# Patient Record
Sex: FEMALE | Race: WHITE | URBAN - METROPOLITAN AREA
[De-identification: names, ages, dates, MRNs, and addresses within clinical notes are randomized per-mention and may not be internally consistent; named-entity substitution may affect disease eponyms.]

---

## 2017-05-22 ENCOUNTER — HISTORICAL (OUTPATIENT)
Dept: RADIOLOGY | Facility: HOSPITAL | Age: 42
End: 2017-05-22

## 2017-06-14 ENCOUNTER — HISTORICAL (OUTPATIENT)
Dept: RADIOLOGY | Facility: HOSPITAL | Age: 42
End: 2017-06-14

## 2017-06-22 ENCOUNTER — HISTORICAL (OUTPATIENT)
Dept: RADIOLOGY | Facility: HOSPITAL | Age: 42
End: 2017-06-22

## 2017-06-22 LAB — POC CREATININE: 0.8 MG/DL (ref 0.6–1.3)

## 2018-04-28 LAB
BILIRUB SERPL-MCNC: NEGATIVE MG/DL
BLOOD URINE, POC: NEGATIVE
CLARITY, POC UA: CLEAR
COLOR, POC UA: NORMAL
GLUCOSE UR QL STRIP: NEGATIVE
KETONES UR QL STRIP: NEGATIVE
LEUKOCYTE EST, POC UA: NEGATIVE
NITRITE, POC UA: NEGATIVE
PH, POC UA: 5
PROTEIN, POC: NEGATIVE
SPECIFIC GRAVITY, POC UA: 1.02
UROBILINOGEN, POC UA: NORMAL

## 2022-04-10 ENCOUNTER — HISTORICAL (OUTPATIENT)
Dept: ADMINISTRATIVE | Facility: HOSPITAL | Age: 47
End: 2022-04-10

## 2022-04-29 VITALS
OXYGEN SATURATION: 98 % | BODY MASS INDEX: 22.9 KG/M2 | DIASTOLIC BLOOD PRESSURE: 84 MMHG | HEIGHT: 60 IN | WEIGHT: 116.63 LBS | SYSTOLIC BLOOD PRESSURE: 129 MMHG

## 2022-09-16 ENCOUNTER — HISTORICAL (OUTPATIENT)
Dept: ADMINISTRATIVE | Facility: HOSPITAL | Age: 47
End: 2022-09-16

## 2024-10-21 LAB
PAP RECOMMENDATION EXT: NORMAL
PAP SMEAR: NORMAL

## 2024-10-23 LAB — BCS RECOMMENDATION EXT: NORMAL

## 2025-04-02 ENCOUNTER — OFFICE VISIT (OUTPATIENT)
Dept: PRIMARY CARE CLINIC | Facility: CLINIC | Age: 50
End: 2025-04-02
Payer: COMMERCIAL

## 2025-04-02 ENCOUNTER — DOCUMENTATION ONLY (OUTPATIENT)
Dept: PRIMARY CARE CLINIC | Facility: CLINIC | Age: 50
End: 2025-04-02

## 2025-04-02 VITALS
RESPIRATION RATE: 18 BRPM | OXYGEN SATURATION: 98 % | BODY MASS INDEX: 25.16 KG/M2 | TEMPERATURE: 99 F | WEIGHT: 124.81 LBS | SYSTOLIC BLOOD PRESSURE: 137 MMHG | DIASTOLIC BLOOD PRESSURE: 87 MMHG | HEIGHT: 59 IN | HEART RATE: 92 BPM

## 2025-04-02 DIAGNOSIS — Z00.00 VISIT FOR ANNUAL HEALTH EXAMINATION: ICD-10-CM

## 2025-04-02 DIAGNOSIS — F32.A DEPRESSION, UNSPECIFIED DEPRESSION TYPE: ICD-10-CM

## 2025-04-02 DIAGNOSIS — I10 HYPERTENSION, UNSPECIFIED TYPE: ICD-10-CM

## 2025-04-02 DIAGNOSIS — Z12.11 COLON CANCER SCREENING: Primary | ICD-10-CM

## 2025-04-02 DIAGNOSIS — Z76.89 ENCOUNTER TO ESTABLISH CARE WITH NEW DOCTOR: Primary | ICD-10-CM

## 2025-04-02 RX ORDER — LISINOPRIL 5 MG/1
5 TABLET ORAL DAILY
COMMUNITY
Start: 2024-12-14

## 2025-04-02 RX ORDER — BUPROPION HYDROCHLORIDE 300 MG/1
300 TABLET ORAL DAILY
COMMUNITY

## 2025-04-02 RX ORDER — VENLAFAXINE HYDROCHLORIDE 75 MG/1
75 CAPSULE, EXTENDED RELEASE ORAL DAILY
COMMUNITY
Start: 2024-03-30

## 2025-04-02 NOTE — ASSESSMENT & PLAN NOTE
Diastolic elevated today   Hold off on refilling lisinopril today  Rechecked at annual in 2 weeks   CBC, CMP

## 2025-04-02 NOTE — PROGRESS NOTES
Family Medicine    Patient ID: [id number]     Chief Complaint: Establish Care (Switching care from ro[facility] /No problems or concerns )      HPI:     Marianne Tineo is a 50 y.o. female here today establish care.  She has a history of depression and sees psychology for those medications.  He previously took lisinopril but has not taken it in several months.  Diastolic BP mildly elevated today.  We will recheck on this in a couple of weeks when she returns for her annual    All healthcare gaps we will be discussed at her annual on a couple of weeks.  Briefly patient is Pap and mammogram were done October of 2024 and we will obtain those records, and we will order a colonoscopy today.    MEDICAL HISTORY:       Past Medical History:   Diagnosis Date    Anxiety     Depression     Hypertension         Past Surgical History:   Procedure Laterality Date    HYSTERECTOMY          Social History     Tobacco Use    Smoking status: Never    Smokeless tobacco: Current   Substance and Sexual Activity    Alcohol use: Yes    Drug use: Never    Sexual activity: Not on file        Current Outpatient Medications   Medication Instructions    buPROPion (WELLBUTRIN XL) 300 mg, Daily    EFFEXOR XR 75 mg, Daily    lisinopriL (PRINIVIL,ZESTRIL) 5 mg, Daily       Review of patient's allergies indicates:  No Known Allergies     Patient Care Team:  Margarito August MD as PCP - General (Family Medicine)  Jonn Stone Jr., MD as Consulting Physician (Obstetrics and Gynecology)     Subjective:     Review of Systems   Constitutional:  Negative for activity change, appetite change, fever and unexpected weight change.   HENT:  Negative for congestion, rhinorrhea and sore throat.    Eyes:  Negative for visual disturbance.   Respiratory:  Negative for cough, chest tightness and shortness of breath.    Cardiovascular:  Negative for chest pain, palpitations and leg swelling.   Gastrointestinal:  Negative for abdominal pain, blood in stool,  "nausea and vomiting.   Genitourinary:  Negative for difficulty urinating.   Musculoskeletal:  Negative for arthralgias.   Skin:  Negative for rash.   Neurological:  Negative for dizziness, speech difficulty, weakness, light-headedness and numbness.   Psychiatric/Behavioral:  Negative for behavioral problems, confusion, dysphoric mood, self-injury, sleep disturbance and suicidal ideas.        12 point review of systems conducted, negative except as stated in the history of present illness. See HPI for details.    Objective:     Visit Vitals  /87 (BP Location: Right arm, Patient Position: Sitting)   Pulse 92   Temp 98.5 °F (36.9 °C) (Oral)   Resp 18   Ht 4' 11" (1.499 m)   Wt 56.6 kg (124 lb 12.8 oz)   SpO2 98%   BMI 25.21 kg/m²       Physical Exam  Constitutional:       General: She is not in acute distress.     Appearance: Normal appearance. She is not ill-appearing.   HENT:      Head: Normocephalic and atraumatic.      Right Ear: External ear normal.      Left Ear: External ear normal.      Nose: No congestion.   Eyes:      General:         Right eye: No discharge.         Left eye: No discharge.      Extraocular Movements: Extraocular movements intact.      Conjunctiva/sclera: Conjunctivae normal.   Cardiovascular:      Rate and Rhythm: Normal rate and regular rhythm.   Pulmonary:      Effort: Pulmonary effort is normal. No respiratory distress.      Breath sounds: Normal breath sounds.   Musculoskeletal:      Right lower leg: No edema.      Left lower leg: No edema.   Skin:     Capillary Refill: Capillary refill takes less than 2 seconds.   Neurological:      Mental Status: She is alert and oriented to person, place, and time. Mental status is at baseline.   Psychiatric:         Mood and Affect: Mood normal.         Behavior: Behavior normal.         Thought Content: Thought content normal.         Judgment: Judgment normal.         Recent labs:     Chemistry:  No results found for: "NA", "K", "CHLORIDE", " ""BUN", "CREATININE", "EGFRNORACEVR", "GLUCOSE", "CALCIUM", "ALKPHOS", "LABPROT", "ALBUMIN", "BILIDIR", "IBILI", "AST", "ALT", "MG", "PHOS", "CLBSSESU71DO", "TSH", "[id number]", "PSA"     No results found for: "HGBA1C", "MICROALBCREA"     Hematology:  No results found for: "WBC", "HGB", "HCT", "PLT"    Lipid Panel:  No results found for: "CHOL", "HDL", "LDL", "TRIG", "TOTALCHOLEST"     Urine:  Lab Results   Component Value Date    LEUKOCYTESUR Negative 04/28/2018        Assessment:       ICD-10-CM ICD-9-CM   1. Encounter to establish care with new doctor  Z76.89 V65.8   2. Visit for annual health examination  Z00.00 V70.0   3. Depression, unspecified depression type  F32.A 311   4. Hypertension, unspecified type  I10 401.9        Plan:     1. Encounter to establish care with new doctor  Comments:  Patient's medical care has been established in this clinic.  All issues addressed, all questions answered,  with appropriate scheduling of follow-up care.    2. Visit for annual health examination  Comments:  This diagnosis does not apply to this visit. Labs placed under this diagnosis today are for a future annual visit.  Orders:  -     CBC Auto Differential; Future; Expected date: 04/02/2025  -     Comprehensive Metabolic Panel; Future; Expected date: 04/02/2025  -     Lipid Panel; Future; Expected date: 04/02/2025  -     TSH; Future; Expected date: 04/02/2025  -     Hemoglobin A1C; Future; Expected date: 04/02/2025  -     Urinalysis; Future; Expected date: 04/02/2025  -     Vitamin D; Future; Expected date: 04/02/2025  -     Hepatitis C Antibody; Future; Expected date: 04/02/2025  -     HIV 1/2 Ag/Ab (4th Gen); Future; Expected date: 04/02/2025    3. Depression, unspecified depression type  Overview:  Wellbutrin 300 mg daily  Effexor 75 mg daily    Follows psychology  BuSpar stopped    Assessment & Plan:  Working well for her   Refill as necessary      4. Hypertension, unspecified type  Overview:  Lisinopril 5 mg daily " (not currently taking)    Assessment & Plan:  Diastolic elevated today   Hold off on refilling lisinopril today  Rechecked at annual in 2 weeks   CBC, CMP           Follow up in about 2 weeks (around 4/16/2025) for Follow Up, Annual. In addition to their scheduled follow up, the patient has also been instructed to follow up on as needed basis.     Future Appointments   Date Time Provider Department Center   4/16/2025  4:00 PM Margarito August MD [facility] David Trahan, MD

## 2025-04-03 ENCOUNTER — DOCUMENTATION ONLY (OUTPATIENT)
Dept: PRIMARY CARE CLINIC | Facility: CLINIC | Age: 50
End: 2025-04-03
Payer: COMMERCIAL

## 2025-04-05 ENCOUNTER — CLINICAL SUPPORT (OUTPATIENT)
Dept: URGENT CARE | Facility: CLINIC | Age: 50
End: 2025-04-05
Payer: COMMERCIAL

## 2025-04-05 DIAGNOSIS — Z00.00 VISIT FOR ANNUAL HEALTH EXAMINATION: ICD-10-CM

## 2025-04-05 LAB
25(OH)D3+25(OH)D2 SERPL-MCNC: 152 NG/ML (ref 30–80)
ALBUMIN SERPL-MCNC: 4 G/DL (ref 3.5–5)
ALBUMIN/GLOB SERPL: 1.3 RATIO (ref 1.1–2)
ALP SERPL-CCNC: 91 UNIT/L (ref 40–150)
ALT SERPL-CCNC: 18 UNIT/L (ref 0–55)
AMORPH URATE CRY URNS QL MICRO: ABNORMAL /UL
ANION GAP SERPL CALC-SCNC: 8 MEQ/L
AST SERPL-CCNC: 16 UNIT/L (ref 11–45)
BACTERIA #/AREA URNS AUTO: ABNORMAL /HPF
BASOPHILS # BLD AUTO: 0.08 X10(3)/MCL
BASOPHILS NFR BLD AUTO: 1.3 %
BILIRUB SERPL-MCNC: 0.5 MG/DL
BILIRUB UR QL STRIP.AUTO: NEGATIVE
BUN SERPL-MCNC: 8.7 MG/DL (ref 9.8–20.1)
CALCIUM SERPL-MCNC: 9.2 MG/DL (ref 8.4–10.2)
CHLORIDE SERPL-SCNC: 107 MMOL/L (ref 98–107)
CHOLEST SERPL-MCNC: 227 MG/DL
CHOLEST/HDLC SERPL: 4 {RATIO} (ref 0–5)
CLARITY UR: ABNORMAL
CO2 SERPL-SCNC: 26 MMOL/L (ref 22–29)
COLOR UR AUTO: ABNORMAL
CREAT SERPL-MCNC: 0.78 MG/DL (ref 0.55–1.02)
CREAT/UREA NIT SERPL: 11
EOSINOPHIL # BLD AUTO: 0.18 X10(3)/MCL (ref 0–0.9)
EOSINOPHIL NFR BLD AUTO: 2.9 %
ERYTHROCYTE [DISTWIDTH] IN BLOOD BY AUTOMATED COUNT: 11.7 % (ref 11.5–17)
EST. AVERAGE GLUCOSE BLD GHB EST-MCNC: 105.4 MG/DL
GFR SERPLBLD CREATININE-BSD FMLA CKD-EPI: >60 ML/MIN/1.73/M2
GLOBULIN SER-MCNC: 3.2 GM/DL (ref 2.4–3.5)
GLUCOSE SERPL-MCNC: 106 MG/DL (ref 74–100)
GLUCOSE UR QL STRIP: NORMAL
HBA1C MFR BLD: 5.3 %
HCT VFR BLD AUTO: 46.8 % (ref 37–47)
HDLC SERPL-MCNC: 56 MG/DL (ref 35–60)
HGB BLD-MCNC: 15.5 G/DL (ref 12–16)
HGB UR QL STRIP: NEGATIVE
IMM GRANULOCYTES # BLD AUTO: 0.02 X10(3)/MCL (ref 0–0.04)
IMM GRANULOCYTES NFR BLD AUTO: 0.3 %
KETONES UR QL STRIP: NEGATIVE
LDLC SERPL CALC-MCNC: 148 MG/DL (ref 50–140)
LEUKOCYTE ESTERASE UR QL STRIP: 25
LYMPHOCYTES # BLD AUTO: 1.45 X10(3)/MCL (ref 0.6–4.6)
LYMPHOCYTES NFR BLD AUTO: 23.7 %
MCH RBC QN AUTO: 29.5 PG (ref 27–31)
MCHC RBC AUTO-ENTMCNC: 33.1 G/DL (ref 33–36)
MCV RBC AUTO: 89 FL (ref 80–94)
MONOCYTES # BLD AUTO: 0.41 X10(3)/MCL (ref 0.1–1.3)
MONOCYTES NFR BLD AUTO: 6.7 %
MUCOUS THREADS URNS QL MICRO: ABNORMAL /LPF
NEUTROPHILS # BLD AUTO: 3.98 X10(3)/MCL (ref 2.1–9.2)
NEUTROPHILS NFR BLD AUTO: 65.1 %
NITRITE UR QL STRIP: NEGATIVE
NRBC BLD AUTO-RTO: 0 %
PH UR STRIP: 6 [PH]
PLATELET # BLD AUTO: 336 X10(3)/MCL (ref 130–400)
PMV BLD AUTO: 9.3 FL (ref 7.4–10.4)
POTASSIUM SERPL-SCNC: 4.2 MMOL/L (ref 3.5–5.1)
PROT SERPL-MCNC: 7.2 GM/DL (ref 6.4–8.3)
PROT UR QL STRIP: ABNORMAL
RBC # BLD AUTO: 5.26 X10(6)/MCL (ref 4.2–5.4)
RBC #/AREA URNS AUTO: ABNORMAL /HPF
SODIUM SERPL-SCNC: 141 MMOL/L (ref 136–145)
SP GR UR STRIP.AUTO: 1.02 (ref 1–1.03)
SQUAMOUS #/AREA URNS LPF: ABNORMAL /HPF
TRIGL SERPL-MCNC: 115 MG/DL (ref 37–140)
TSH SERPL-ACNC: 1 UIU/ML (ref 0.35–4.94)
UROBILINOGEN UR STRIP-ACNC: NORMAL
VLDLC SERPL CALC-MCNC: 23 MG/DL
WBC # BLD AUTO: 6.12 X10(3)/MCL (ref 4.5–11.5)
WBC #/AREA URNS AUTO: ABNORMAL /HPF

## 2025-04-05 PROCEDURE — 81001 URINALYSIS AUTO W/SCOPE: CPT | Performed by: FAMILY MEDICINE

## 2025-04-05 PROCEDURE — 84443 ASSAY THYROID STIM HORMONE: CPT | Performed by: FAMILY MEDICINE

## 2025-04-05 PROCEDURE — 80061 LIPID PANEL: CPT | Performed by: FAMILY MEDICINE

## 2025-04-05 PROCEDURE — 86803 HEPATITIS C AB TEST: CPT | Performed by: FAMILY MEDICINE

## 2025-04-05 PROCEDURE — 36415 COLL VENOUS BLD VENIPUNCTURE: CPT

## 2025-04-05 PROCEDURE — 83036 HEMOGLOBIN GLYCOSYLATED A1C: CPT | Performed by: FAMILY MEDICINE

## 2025-04-05 PROCEDURE — 85025 COMPLETE CBC W/AUTO DIFF WBC: CPT | Performed by: FAMILY MEDICINE

## 2025-04-05 PROCEDURE — 87389 HIV-1 AG W/HIV-1&-2 AB AG IA: CPT | Performed by: FAMILY MEDICINE

## 2025-04-05 PROCEDURE — 82306 VITAMIN D 25 HYDROXY: CPT | Performed by: FAMILY MEDICINE

## 2025-04-05 PROCEDURE — 80053 COMPREHEN METABOLIC PANEL: CPT | Performed by: FAMILY MEDICINE

## 2025-04-05 NOTE — PROGRESS NOTES
Pt presents to clinic for a lab draw for Dr. August. A HIV, HEPATITIS C, VITAMIN D, U/A, A1C, TSH, LIPID PANEL, CMP, AND CBC were collected using a green top tube, four purple top tubes, and a urine cup. Pt tolerated well.

## 2025-04-07 ENCOUNTER — RESULTS FOLLOW-UP (OUTPATIENT)
Dept: FAMILY MEDICINE | Facility: CLINIC | Age: 50
End: 2025-04-07

## 2025-04-07 LAB
HCV AB SERPL QL IA: NONREACTIVE
HIV 1+2 AB+HIV1 P24 AG SERPL QL IA: NONREACTIVE

## 2025-04-16 ENCOUNTER — OFFICE VISIT (OUTPATIENT)
Dept: PRIMARY CARE CLINIC | Facility: CLINIC | Age: 50
End: 2025-04-16
Payer: COMMERCIAL

## 2025-04-16 VITALS
BODY MASS INDEX: 24.8 KG/M2 | HEART RATE: 96 BPM | WEIGHT: 123 LBS | OXYGEN SATURATION: 96 % | RESPIRATION RATE: 18 BRPM | HEIGHT: 59 IN | DIASTOLIC BLOOD PRESSURE: 88 MMHG | TEMPERATURE: 98 F | SYSTOLIC BLOOD PRESSURE: 135 MMHG

## 2025-04-16 DIAGNOSIS — I10 HYPERTENSION, UNSPECIFIED TYPE: ICD-10-CM

## 2025-04-16 DIAGNOSIS — E78.00 HYPERCHOLESTEROLEMIA: ICD-10-CM

## 2025-04-16 DIAGNOSIS — E67.3 HIGH VITAMIN D LEVEL: ICD-10-CM

## 2025-04-16 DIAGNOSIS — R63.5 WEIGHT GAIN: Primary | ICD-10-CM

## 2025-04-16 RX ORDER — ROSUVASTATIN CALCIUM 5 MG/1
5 TABLET, COATED ORAL DAILY
Qty: 90 TABLET | Refills: 3 | Status: SHIPPED | OUTPATIENT
Start: 2025-04-16 | End: 2026-04-16

## 2025-04-16 RX ORDER — PHENTERMINE HYDROCHLORIDE 37.5 MG/1
37.5 TABLET ORAL
Qty: 30 TABLET | Refills: 0 | Status: SHIPPED | OUTPATIENT
Start: 2025-04-16 | End: 2025-05-16

## 2025-04-16 NOTE — PROGRESS NOTES
Family Medicine    Patient ID: [id number]     Chief Complaint: Follow-up (Would like to discuss labs//Weight loss options  )      HPI:     Marianne Tineo is a 50 y.o. female here today for annual visit    Hyperlipidemia:  80th percentile for LDL-C and non HDLC.  Start Crestor 5 to turn in three-month    Elevated vitamin-D:  She takes the current supplement unknown dose.  She will cut in half and we will repeat that in three-month    Weight:  She wants to lose 10 lb.  BMI is 24.9.  Incretin agonist unaffordable.  We settled on Adipex for maximum three-month per FDA approval timeline.  Also discussed BMI cutoff of 22 where no further meds could be prescribed.  Discussed side-effects in detail and risks were accepted.      QUALITY METRICS:       Statin Therapy    A1c Control    BP Control    Breast Ca Scrn    Colon Ca Scrn    Cervical Ca Scrn    DM Eye Exam Scrn    Tobacco Q rate        MEDICAL HISTORY:       Past Medical History:   Diagnosis Date    Anxiety     Depression     Hypertension         Past Surgical History:   Procedure Laterality Date    HYSTERECTOMY          Social History     Tobacco Use    Smoking status: Never    Smokeless tobacco: Current   Substance and Sexual Activity    Alcohol use: Yes    Drug use: Never    Sexual activity: Not on file        Current Outpatient Medications   Medication Instructions    buPROPion (WELLBUTRIN XL) 300 mg, Daily    EFFEXOR XR 75 mg, Daily    lisinopriL (PRINIVIL,ZESTRIL) 5 mg, Daily    phentermine (ADIPEX-P) 37.5 mg, Oral, Before breakfast    rosuvastatin (CRESTOR) 5 mg, Oral, Daily       Review of patient's allergies indicates:  No Known Allergies     Patient Care Team:  Margarito August MD as PCP - General (Family Medicine)  Jonn Stone Jr., MD as Consulting Physician (Obstetrics and Gynecology)     Subjective:     Review of Systems   Constitutional:  Negative for activity change, appetite change, fever and unexpected weight change.   HENT:  Negative for  "congestion, rhinorrhea and sore throat.    Eyes:  Negative for visual disturbance.   Respiratory:  Negative for cough, chest tightness and shortness of breath.    Cardiovascular:  Negative for chest pain, palpitations and leg swelling.   Gastrointestinal:  Negative for abdominal pain, blood in stool, nausea and vomiting.   Genitourinary:  Negative for difficulty urinating.   Musculoskeletal:  Negative for arthralgias.   Skin:  Negative for rash.   Neurological:  Negative for dizziness, speech difficulty, weakness, light-headedness and numbness.   Psychiatric/Behavioral:  Negative for behavioral problems, confusion, dysphoric mood, self-injury, sleep disturbance and suicidal ideas.        12 point review of systems conducted, negative except as stated in the history of present illness. See HPI for details.    Objective:     Visit Vitals  /88 (BP Location: Left arm, Patient Position: Sitting)   Pulse 96   Temp 97.9 °F (36.6 °C) (Oral)   Resp 18   Ht 4' 11" (1.499 m)   Wt 55.8 kg (123 lb)   SpO2 96%   BMI 24.84 kg/m²       Physical Exam  Constitutional:       General: She is not in acute distress.     Appearance: Normal appearance. She is not ill-appearing.   HENT:      Head: Normocephalic and atraumatic.      Right Ear: External ear normal.      Left Ear: External ear normal.      Nose: No congestion.   Eyes:      General:         Right eye: No discharge.         Left eye: No discharge.      Extraocular Movements: Extraocular movements intact.      Conjunctiva/sclera: Conjunctivae normal.   Cardiovascular:      Rate and Rhythm: Normal rate and regular rhythm.   Pulmonary:      Effort: Pulmonary effort is normal. No respiratory distress.      Breath sounds: Normal breath sounds.   Musculoskeletal:      Right lower leg: No edema.      Left lower leg: No edema.   Skin:     Capillary Refill: Capillary refill takes less than 2 seconds.   Neurological:      Mental Status: She is alert and oriented to person, place, " and time. Mental status is at baseline.   Psychiatric:         Mood and Affect: Mood normal.         Behavior: Behavior normal.         Thought Content: Thought content normal.         Judgment: Judgment normal.         Recent labs:     Chemistry:  Lab Results   Component Value Date     04/05/2025    K 4.2 04/05/2025    BUN 8.7 (L) 04/05/2025    CREATININE 0.78 04/05/2025    EGFRNORACEVR >60 04/05/2025    GLUCOSE 106 (H) 04/05/2025    CALCIUM 9.2 04/05/2025    ALKPHOS 91 04/05/2025    LABPROT 7.2 04/05/2025    ALBUMIN 4.0 04/05/2025    AST 16 04/05/2025    ALT 18 04/05/2025    [id number] 152 (H) 04/05/2025    TSH 0.997 04/05/2025        Lab Results   Component Value Date    HGBA1C 5.3 04/05/2025        Hematology:  Lab Results   Component Value Date    WBC 6.12 04/05/2025    HGB 15.5 04/05/2025    HCT 46.8 04/05/2025     04/05/2025       Lipid Panel:  Lab Results   Component Value Date    CHOL 227 (H) 04/05/2025    HDL 56 04/05/2025    .00 (H) 04/05/2025    TRIG 115 04/05/2025    TOTALCHOLEST 4 04/05/2025        Urine:  Lab Results   Component Value Date    APPEARANCEUA Turbid (A) 04/05/2025    SGUA 1.024 04/05/2025    PROTEINUA 1+ (A) 04/05/2025    KETONESUA Negative 04/05/2025    LEUKOCYTESUR 25 (A) 04/05/2025    RBCUA 0-5 04/05/2025    WBCUA None Seen 04/05/2025    BACTERIA None Seen 04/05/2025    SQEPUA Moderate (A) 04/05/2025        Assessment:       ICD-10-CM ICD-9-CM   1. Weight gain  R63.5 783.1   2. Hypercholesterolemia  E78.00 272.0   3. High vitamin D level  E67.3 278.4   4. Hypertension, unspecified type  I10 401.9        Plan:     1. Weight gain  Overview:  We would like to lose only 10 lb   Incretin agonist are not affordable   BMI is 24    She would like to try Adipex for 3 months   Discussed risks and risks were accepted   Denies chest pain, CAD, anxiety, palpitations, insomnia,  We will give for 3 months   Return in 3 months to re way      Orders:  -     phentermine (ADIPEX-P)  37.5 mg tablet; Take 1 tablet (37.5 mg total) by mouth before breakfast.  Dispense: 30 tablet; Refill: 0    2. Hypercholesterolemia  Overview:  Medication:  None  Risk category:  Low, hypertension    The 10-year ASCVD risk score (Radha HOPE, et al., 2019) is: 2.2%    Values used to calculate the score:      Age: 50 years      Sex: Female      Is Non- : No      Diabetic: No      Tobacco smoker: No      Systolic Blood Pressure: 135 mmHg      Is BP treated: Yes      HDL Cholesterol: 56 mg/dL      Total Cholesterol: 227 mg/dL    ApoB:  Non-HDL-C:  171  LDL-C:  148    Plan:  Eightieth percentile for LDL-C and non HDLC   Start rosuvastatin 5   Return three-month      Role of apolipoprotein B in the clinical management of cardiovascular risk in adults: An Expert Clinical Consensus from the National Lipid Association.   Journal of Clinical Lipidology        Orders:  -     rosuvastatin (CRESTOR) 5 MG tablet; Take 1 tablet (5 mg total) by mouth once daily.  Dispense: 90 tablet; Refill: 3  -     Lipid Panel; Future; Expected date: 07/16/2025    3. High vitamin D level  Overview:  OTC supplementation, unknown dose   Vitamin-D 57 today   Discussed risks of elevated vitamin-D     Cut dose in half   Repeat three-month    Orders:  -     Vitamin D; Future; Expected date: 07/16/2025    4. Hypertension, unspecified type  Overview:  None    Previously on Lisinopril 5 mg daily    Assessment & Plan:  Diastolic elevated today  Continue holding lisinopril   Return three-month           No follow-ups on file. In addition to their scheduled follow up, the patient has also been instructed to follow up on as needed basis.     Future Appointments   Date Time Provider Department Center   7/16/2025  4:00 PM Margarito August MD [facility] David Trahan, MD

## 2025-04-22 ENCOUNTER — TELEPHONE (OUTPATIENT)
Dept: PRIMARY CARE CLINIC | Facility: CLINIC | Age: 50
End: 2025-04-22
Payer: COMMERCIAL

## 2025-04-22 NOTE — TELEPHONE ENCOUNTER
Attempted to notify pt that PA for adipex-p/ phentermine HCl tablets was denied.  No answer, left voicemail

## 2025-07-09 LAB — CRC RECOMMENDATION EXT: NORMAL

## 2025-07-11 ENCOUNTER — DOCUMENTATION ONLY (OUTPATIENT)
Dept: PRIMARY CARE CLINIC | Facility: CLINIC | Age: 50
End: 2025-07-11
Payer: COMMERCIAL

## 2025-07-12 ENCOUNTER — CLINICAL SUPPORT (OUTPATIENT)
Dept: URGENT CARE | Facility: CLINIC | Age: 50
End: 2025-07-12
Payer: COMMERCIAL

## 2025-07-12 DIAGNOSIS — E78.00 HYPERCHOLESTEROLEMIA: ICD-10-CM

## 2025-07-12 DIAGNOSIS — E67.3 HIGH VITAMIN D LEVEL: ICD-10-CM

## 2025-07-12 LAB
25(OH)D3+25(OH)D2 SERPL-MCNC: 65 NG/ML (ref 30–80)
CHOLEST SERPL-MCNC: 186 MG/DL
CHOLEST/HDLC SERPL: 3 {RATIO} (ref 0–5)
HDLC SERPL-MCNC: 58 MG/DL (ref 35–60)
LDLC SERPL CALC-MCNC: 111 MG/DL (ref 50–140)
TRIGL SERPL-MCNC: 83 MG/DL (ref 37–140)
VLDLC SERPL CALC-MCNC: 17 MG/DL

## 2025-07-12 PROCEDURE — 82306 VITAMIN D 25 HYDROXY: CPT | Performed by: FAMILY MEDICINE

## 2025-07-12 PROCEDURE — 80061 LIPID PANEL: CPT | Performed by: FAMILY MEDICINE

## 2025-07-12 NOTE — PROGRESS NOTES
Subjective:      Patient ID: Marianne Tineo is a 50 y.o. female.    Vitals:  vitals were not taken for this visit.     Chief Complaint: No chief complaint on file.    HPI  ROS   Objective:     Physical Exam    Assessment:     1. High vitamin D level    2. Hypercholesterolemia        Plan:       High vitamin D level  -     Vitamin D    Hypercholesterolemia  -     Lipid Panel

## 2025-07-12 NOTE — PROGRESS NOTES
Patient presented to clinic for labwork for Dr. August.  Labwork completed without incident and patient discharged home.

## 2025-07-16 ENCOUNTER — OFFICE VISIT (OUTPATIENT)
Dept: PRIMARY CARE CLINIC | Facility: CLINIC | Age: 50
End: 2025-07-16
Payer: COMMERCIAL

## 2025-07-16 VITALS
HEART RATE: 96 BPM | TEMPERATURE: 98 F | WEIGHT: 110.38 LBS | OXYGEN SATURATION: 96 % | RESPIRATION RATE: 18 BRPM | HEIGHT: 59 IN | DIASTOLIC BLOOD PRESSURE: 81 MMHG | BODY MASS INDEX: 22.25 KG/M2 | SYSTOLIC BLOOD PRESSURE: 134 MMHG

## 2025-07-16 DIAGNOSIS — Z12.39 ENCOUNTER FOR SCREENING FOR MALIGNANT NEOPLASM OF BREAST, UNSPECIFIED SCREENING MODALITY: ICD-10-CM

## 2025-07-16 DIAGNOSIS — E78.00 HYPERCHOLESTEROLEMIA: Primary | ICD-10-CM

## 2025-07-16 DIAGNOSIS — E67.3 HIGH VITAMIN D LEVEL: ICD-10-CM

## 2025-07-16 DIAGNOSIS — I10 HYPERTENSION, UNSPECIFIED TYPE: ICD-10-CM

## 2025-07-16 PROCEDURE — 99214 OFFICE O/P EST MOD 30 MIN: CPT | Mod: ,,, | Performed by: FAMILY MEDICINE

## 2025-07-16 PROCEDURE — 3044F HG A1C LEVEL LT 7.0%: CPT | Mod: CPTII,,, | Performed by: FAMILY MEDICINE

## 2025-07-16 PROCEDURE — 4010F ACE/ARB THERAPY RXD/TAKEN: CPT | Mod: CPTII,,, | Performed by: FAMILY MEDICINE

## 2025-07-16 PROCEDURE — 3008F BODY MASS INDEX DOCD: CPT | Mod: CPTII,,, | Performed by: FAMILY MEDICINE

## 2025-07-16 PROCEDURE — 3075F SYST BP GE 130 - 139MM HG: CPT | Mod: CPTII,,, | Performed by: FAMILY MEDICINE

## 2025-07-16 PROCEDURE — G2211 COMPLEX E/M VISIT ADD ON: HCPCS | Mod: ,,, | Performed by: FAMILY MEDICINE

## 2025-07-16 PROCEDURE — 3079F DIAST BP 80-89 MM HG: CPT | Mod: CPTII,,, | Performed by: FAMILY MEDICINE

## 2025-07-16 PROCEDURE — 1159F MED LIST DOCD IN RCRD: CPT | Mod: CPTII,,, | Performed by: FAMILY MEDICINE

## 2025-07-16 RX ORDER — VILAZODONE HYDROCHLORIDE 10 MG/1
40 TABLET ORAL DAILY
COMMUNITY
Start: 2025-06-18

## 2025-07-16 RX ORDER — ALPRAZOLAM 0.5 MG/1
0.5 TABLET ORAL 3 TIMES DAILY PRN
COMMUNITY
Start: 2025-06-18

## 2025-07-16 NOTE — PROGRESS NOTES
Family Medicine    Patient ID: [id number]     Chief Complaint: Follow-up (Would like to discuss labs)      HPI:     Marianne Tineo is a 50 y.o. female here today for follow up:     Cholesterol:  Started rosuvastatin 5, LDL-C dropped from 148 to now 111 close to goal of below 104 low risk.  Non HDLC is 128, which is at goal for low risk.    She is not taking the statin, is taking compounded attached retired instead.  Gave her option to start the statin for even further CV risk reduction or continue holding it.  Repeat six-months    Vitamin-D:  Previously elevated at 152, now at 65.  She Stopped taking medication completely.  Continue current dosage and repeat in six-months.      Hypertension:  Currently holding lisinopril.  BP at goal today.  Continue holding lisinopril return for six-months check.    QUALITY METRICS:       Statin Therapy Met   A1c Control Met   BP Control Met   Breast Ca Scrn Due October 2025   Colon Ca Scrn Next due 2035   Cervical Ca Scrn    DM Eye Exam Scrn    Tobacco Q rate        MEDICAL HISTORY:       Past Medical History:   Diagnosis Date    Anxiety     Depression     Hypertension         Past Surgical History:   Procedure Laterality Date    HYSTERECTOMY          Social History     Tobacco Use    Smoking status: Never    Smokeless tobacco: Current   Substance and Sexual Activity    Alcohol use: Yes    Drug use: Never    Sexual activity: Not on file        Current Outpatient Medications   Medication Instructions    ALPRAZolam (XANAX) 0.5 mg, 3 times daily PRN    buPROPion (WELLBUTRIN XL) 300 mg, Daily    EFFEXOR XR 75 mg, Daily    lisinopriL (PRINIVIL,ZESTRIL) 5 mg, Daily    rosuvastatin (CRESTOR) 5 mg, Oral, Daily    vilazodone (VIIBRYD) 40 mg, Daily       Review of patient's allergies indicates:  No Known Allergies     Patient Care Team:  Margarito August MD as PCP - General (Family Medicine)  Jonn Stone Jr., MD as Consulting Physician (Obstetrics and Gynecology)     Subjective:  "    Review of Systems   Constitutional:  Negative for activity change, appetite change, fever and unexpected weight change.   HENT:  Negative for congestion, rhinorrhea and sore throat.    Eyes:  Negative for visual disturbance.   Respiratory:  Negative for cough, chest tightness and shortness of breath.    Cardiovascular:  Negative for chest pain, palpitations and leg swelling.   Gastrointestinal:  Negative for abdominal pain, blood in stool, nausea and vomiting.   Genitourinary:  Negative for difficulty urinating.   Musculoskeletal:  Negative for arthralgias.   Skin:  Negative for rash.   Neurological:  Negative for dizziness, speech difficulty, weakness, light-headedness and numbness.   Psychiatric/Behavioral:  Negative for behavioral problems, confusion, dysphoric mood, self-injury, sleep disturbance and suicidal ideas.        12 point review of systems conducted, negative except as stated in the history of present illness. See HPI for details.    Objective:     Visit Vitals  /81 (BP Location: Right arm, Patient Position: Sitting)   Pulse (P) 88   Temp 97.8 °F (36.6 °C) (Oral)   Resp 18   Ht 4' 11" (1.499 m)   Wt 50.1 kg (110 lb 6.4 oz)   SpO2 96%   BMI 22.30 kg/m²       Physical Exam  Constitutional:       General: She is not in acute distress.     Appearance: Normal appearance. She is not ill-appearing.   HENT:      Head: Normocephalic and atraumatic.      Right Ear: External ear normal.      Left Ear: External ear normal.      Nose: No congestion.   Eyes:      General:         Right eye: No discharge.         Left eye: No discharge.      Extraocular Movements: Extraocular movements intact.      Conjunctiva/sclera: Conjunctivae normal.   Cardiovascular:      Rate and Rhythm: Normal rate and regular rhythm.   Pulmonary:      Effort: Pulmonary effort is normal. No respiratory distress.      Breath sounds: Normal breath sounds.   Musculoskeletal:      Right lower leg: No edema.      Left lower leg: No " edema.   Skin:     Capillary Refill: Capillary refill takes less than 2 seconds.   Neurological:      Mental Status: She is alert and oriented to person, place, and time. Mental status is at baseline.   Psychiatric:         Mood and Affect: Mood normal.         Behavior: Behavior normal.         Thought Content: Thought content normal.         Judgment: Judgment normal.         Recent labs:     Chemistry:  Lab Results   Component Value Date     04/05/2025    K 4.2 04/05/2025    BUN 8.7 (L) 04/05/2025    CREATININE 0.78 04/05/2025    EGFRNORACEVR >60 04/05/2025    CALCIUM 9.2 04/05/2025    ALKPHOS 91 04/05/2025    ALBUMIN 4.0 04/05/2025    AST 16 04/05/2025    ALT 18 04/05/2025    [id number] 65 07/12/2025    TSH 0.997 04/05/2025        Lab Results   Component Value Date    HGBA1C 5.3 04/05/2025        Hematology:  Lab Results   Component Value Date    WBC 6.12 04/05/2025    HGB 15.5 04/05/2025    HCT 46.8 04/05/2025     04/05/2025       Lipid Panel:  Lab Results   Component Value Date    CHOL 186 07/12/2025    HDL 58 07/12/2025    .00 07/12/2025    TRIG 83 07/12/2025    TOTALCHOLEST 3 07/12/2025        Urine:  Lab Results   Component Value Date    APPEARANCEUA Turbid (A) 04/05/2025    SGUA 1.024 04/05/2025    PROTEINUA 1+ (A) 04/05/2025    KETONESUA Negative 04/05/2025    LEUKOCYTESUR 25 (A) 04/05/2025    RBCUA 0-5 04/05/2025    WBCUA None Seen 04/05/2025    BACTERIA None Seen 04/05/2025    SQEPUA Moderate (A) 04/05/2025        Assessment:       ICD-10-CM ICD-9-CM   1. Hypercholesterolemia  E78.00 272.0   2. High vitamin D level  E67.3 278.4   3. Hypertension, unspecified type  I10 401.9   4. Encounter for screening for malignant neoplasm of breast, unspecified screening modality  Z12.39 V76.10        Plan:     1. Hypercholesterolemia  Overview:  Medication:  None  Risk category:  Low, hypertension    The 10-year ASCVD risk score (Radha HOPE, et al., 2019) is: 2.2%    Values used to calculate the  score:      Age: 50 years      Sex: Female      Is Non- : No      Diabetic: No      Tobacco smoker: No      Systolic Blood Pressure: 135 mmHg      Is BP treated: Yes      HDL Cholesterol: 56 mg/dL      Total Cholesterol: 227 mg/dL    Plan:  Started rosuvastatin 5, LDL-C dropped from 148 to now 111 (80th percentile to 40th percentile), close to goal of below 100 for low risk.  Non HDLC is 128, which is at goal for low risk.  Continue current medication at same dose.  Repeat six-months       Role of apolipoprotein B in the clinical management of cardiovascular risk in adults: An Expert Clinical Consensus from the National Lipid Association.   Journal of Clinical Lipidology        Orders:  -     CBC Auto Differential; Future; Expected date: 01/16/2026  -     Comprehensive Metabolic Panel; Future; Expected date: 01/16/2026  -     Lipid Panel; Future; Expected date: 01/16/2026  -     Hemoglobin A1C; Future; Expected date: 01/16/2026  -     Urinalysis; Future; Expected date: 01/16/2026  -     Vitamin D; Future; Expected date: 01/16/2026    2. High vitamin D level  Overview:  Previously elevated at 152, now at 65.  Continue current dosage and repeat in six-months.      Orders:  -     CBC Auto Differential; Future; Expected date: 01/16/2026  -     Comprehensive Metabolic Panel; Future; Expected date: 01/16/2026  -     Lipid Panel; Future; Expected date: 01/16/2026  -     Hemoglobin A1C; Future; Expected date: 01/16/2026  -     Urinalysis; Future; Expected date: 01/16/2026  -     Vitamin D; Future; Expected date: 01/16/2026    3. Hypertension, unspecified type  Overview:  None    Previously on Lisinopril 5 mg daily    Assessment & Plan:  At goal   Continue holding lisinopril    Orders:  -     CBC Auto Differential; Future; Expected date: 01/16/2026  -     Comprehensive Metabolic Panel; Future; Expected date: 01/16/2026  -     Lipid Panel; Future; Expected date: 01/16/2026  -     Hemoglobin A1C;  Future; Expected date: 01/16/2026  -     Urinalysis; Future; Expected date: 01/16/2026  -     Vitamin D; Future; Expected date: 01/16/2026    4. Encounter for screening for malignant neoplasm of breast, unspecified screening modality  -     Mammo Digital Screening Bilat w/ Waldemar (XPD); Future; Expected date: 10/27/2025         No follow-ups on file. In addition to their scheduled follow up, the patient has also been instructed to follow up on as needed basis.     Future Appointments   Date Time Provider Department Center   1/15/2026  4:00 PM Margarito August MD [facility] August MD